# Patient Record
Sex: FEMALE | Race: WHITE
[De-identification: names, ages, dates, MRNs, and addresses within clinical notes are randomized per-mention and may not be internally consistent; named-entity substitution may affect disease eponyms.]

---

## 2018-05-09 ENCOUNTER — HOSPITAL ENCOUNTER (OUTPATIENT)
Dept: HOSPITAL 31 - C.SPRAD | Age: 53
Discharge: HOME | End: 2018-05-09
Attending: RADIOLOGY
Payer: COMMERCIAL

## 2018-05-09 VITALS
SYSTOLIC BLOOD PRESSURE: 110 MMHG | HEART RATE: 72 BPM | RESPIRATION RATE: 18 BRPM | DIASTOLIC BLOOD PRESSURE: 70 MMHG | TEMPERATURE: 98 F | OXYGEN SATURATION: 100 %

## 2018-05-09 VITALS — BODY MASS INDEX: 29.2 KG/M2

## 2018-05-09 DIAGNOSIS — D11.0: Primary | ICD-10-CM

## 2018-05-09 NOTE — [id number].[email or web address]
Surgeon's Initial Post Op Note





- Surgeon's Notes


Surgeon: Martínez Chow MD


Assistant: NONE


Type of Anesthesia: Local


Pre-Operative Diagnosis: Right parotid nodule


Operative Findings: 11 mm hypoechoic right parotid nodule


Post-Operative Diagnosis: Right parotid nodule


Operation Performed: US guided FNA


Specimen/Specimens Removed: 25 g FNA x 4


Estimated Blood Loss: EBL {In ML}: 1


Blood Products Given: N/A


Drains Used: No Drains


Post-Op Condition: Good


Date of Surgery/Procedure: 05/09/18


Time of Surgery/Procedure: 12:30

## 2018-05-09 NOTE — CP.SDSHP
Same Day Surgery [facility]





- History


Proposed Procedure: US guided FNA of right parotid lesion.


Pre-Op Diagnosis: 11 mm right parotid nodule





- Allergies


Allergies: 


Allergies





No Known Allergies Allergy (Verified 12/23/14 09:58)


 











- Physical Exam


Vital Signs: 


 Vital Signs











  05/09/18





  10:56


 


Temperature 97.4 F L


 


Pulse Rate 60


 


Respiratory 20





Rate 


 


Blood Pressure 147/88


 


O2 Sat by Pulse 98





Oximetry 











Mental Status: Alert & Oriented x3


Neuro: WNL


Heart: WNL





- Impression


Impression: Pt with  an 11 mm right parotid lesion. Plan US guided FNA.


Pt. Evaluated Today:Candidate for Anesthesia & Procedure: No





- Date & Time


Date: 05/09/18


Time: 12:00





Short Stay Discharge





- Short Stay Discharge


Admitting Diagnosis/Reason for Visit: parotid mass


Disposition: HOME/ ROUTINE